# Patient Record
Sex: FEMALE | Race: WHITE | Employment: STUDENT | URBAN - NONMETROPOLITAN AREA
[De-identification: names, ages, dates, MRNs, and addresses within clinical notes are randomized per-mention and may not be internally consistent; named-entity substitution may affect disease eponyms.]

---

## 2023-09-18 ENCOUNTER — OFFICE VISIT (OUTPATIENT)
Age: 18
End: 2023-09-18

## 2023-09-18 VITALS
HEIGHT: 65 IN | RESPIRATION RATE: 16 BRPM | SYSTOLIC BLOOD PRESSURE: 120 MMHG | BODY MASS INDEX: 30.99 KG/M2 | TEMPERATURE: 98.6 F | WEIGHT: 186 LBS | HEART RATE: 53 BPM | OXYGEN SATURATION: 99 % | DIASTOLIC BLOOD PRESSURE: 68 MMHG

## 2023-09-18 DIAGNOSIS — N92.0 MENORRHAGIA WITH REGULAR CYCLE: ICD-10-CM

## 2023-09-18 LAB
HCG, URINE, POC: NEGATIVE
Lab: NORMAL
NEGATIVE QC PASS/FAIL: NORMAL
POSITIVE QC PASS/FAIL: NORMAL

## 2023-09-18 PROCEDURE — 99214 OFFICE O/P EST MOD 30 MIN: CPT | Performed by: STUDENT IN AN ORGANIZED HEALTH CARE EDUCATION/TRAINING PROGRAM

## 2023-09-18 PROCEDURE — 81025 URINE PREGNANCY TEST: CPT | Performed by: STUDENT IN AN ORGANIZED HEALTH CARE EDUCATION/TRAINING PROGRAM

## 2023-09-18 RX ORDER — NORGESTIMATE AND ETHINYL ESTRADIOL 7DAYSX3 28
1 KIT ORAL DAILY
Qty: 28 TABLET | Refills: 11 | Status: SHIPPED | OUTPATIENT
Start: 2023-09-18

## 2023-09-18 RX ORDER — FLUOXETINE HYDROCHLORIDE 40 MG/1
CAPSULE ORAL
COMMUNITY
Start: 2021-04-18

## 2023-09-18 SDOH — ECONOMIC STABILITY: HOUSING INSECURITY
IN THE LAST 12 MONTHS, WAS THERE A TIME WHEN YOU DID NOT HAVE A STEADY PLACE TO SLEEP OR SLEPT IN A SHELTER (INCLUDING NOW)?: NO

## 2023-09-18 SDOH — ECONOMIC STABILITY: TRANSPORTATION INSECURITY
IN THE PAST 12 MONTHS, HAS LACK OF TRANSPORTATION KEPT YOU FROM MEETINGS, WORK, OR FROM GETTING THINGS NEEDED FOR DAILY LIVING?: NO

## 2023-09-18 SDOH — ECONOMIC STABILITY: INCOME INSECURITY: HOW HARD IS IT FOR YOU TO PAY FOR THE VERY BASICS LIKE FOOD, HOUSING, MEDICAL CARE, AND HEATING?: NOT VERY HARD

## 2023-09-18 SDOH — ECONOMIC STABILITY: FOOD INSECURITY: WITHIN THE PAST 12 MONTHS, THE FOOD YOU BOUGHT JUST DIDN'T LAST AND YOU DIDN'T HAVE MONEY TO GET MORE.: NEVER TRUE

## 2023-09-18 SDOH — ECONOMIC STABILITY: FOOD INSECURITY: WITHIN THE PAST 12 MONTHS, YOU WORRIED THAT YOUR FOOD WOULD RUN OUT BEFORE YOU GOT MONEY TO BUY MORE.: NEVER TRUE

## 2023-09-18 ASSESSMENT — PATIENT HEALTH QUESTIONNAIRE - PHQ9
2. FEELING DOWN, DEPRESSED OR HOPELESS: 0
SUM OF ALL RESPONSES TO PHQ QUESTIONS 1-9: 0
1. LITTLE INTEREST OR PLEASURE IN DOING THINGS: 0
SUM OF ALL RESPONSES TO PHQ9 QUESTIONS 1 & 2: 0

## 2023-09-18 NOTE — PROGRESS NOTES
[address] [address]  Dept: [phone]  Loc: [phone]    Yovany Guardado is a 21 y.o. female who presents today for:  Chief Complaint   Patient presents with    Contraception     Pt wanting birth control. Never sexually active. Normal periods. Assessment/Plan:     Cindy Payan was seen today for contraception. Diagnoses and all orders for this visit:    Menorrhagia with regular cycle  -     POC Pregnancy Urine Qual  -     Norgestim-Eth Estrad Triphasic (TRI-SPRINTEC) 0.18/0.215/0.25 MG-35 MCG TABS; Take 1 tablet by mouth daily      After discussion about options, patient would like to try combo pill as above for period control. No follow-ups on file. Medications Prescribed:  Orders Placed This Encounter   Medications    Norgestim-Eth Estrad Triphasic (TRI-SPRINTEC) 0.18/0.215/0.25 MG-35 MCG TABS     Sig: Take 1 tablet by mouth daily     Dispense:  28 tablet     Refill:  11       No future appointments. HPI:     HPI  Patient states that she is interested in something for period control. Has regular periods but state can be heavy at times. She doesn't ever know when they are going to be heavy or not. 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 She also does get period cramps monthly and feels like sometimes these are severe. Not yet sexually active. Does have boyfriend currently. States mom has had a clot in the past but patient has never had a clot, doesn't think there's a clotting disorder in family. No history of migraines or migraines with Aura. Subjective:      Review of Systems   Genitourinary:  Positive for menstrual problem and vaginal bleeding. Negative for dysuria, frequency, genital sores, pelvic pain, urgency, vaginal discharge and vaginal pain.          Objective:     Vitals:    09/18/23 1457   BP: 120/68   Pulse: 53   Resp: 16   Temp:

## 2023-12-05 ENCOUNTER — OFFICE VISIT (OUTPATIENT)
Age: 18
End: 2023-12-05

## 2023-12-05 VITALS
WEIGHT: 196 LBS | HEART RATE: 53 BPM | SYSTOLIC BLOOD PRESSURE: 118 MMHG | DIASTOLIC BLOOD PRESSURE: 78 MMHG | TEMPERATURE: 98.4 F | RESPIRATION RATE: 18 BRPM | BODY MASS INDEX: 32.62 KG/M2 | OXYGEN SATURATION: 98 %

## 2023-12-05 DIAGNOSIS — M54.50 ACUTE BILATERAL LOW BACK PAIN, UNSPECIFIED WHETHER SCIATICA PRESENT: ICD-10-CM

## 2023-12-05 DIAGNOSIS — U07.1 COVID-19: Primary | ICD-10-CM

## 2023-12-05 PROBLEM — F50.20 BULIMIA NERVOSA: Status: ACTIVE | Noted: 2023-05-22

## 2023-12-05 PROBLEM — F41.9 ANXIETY DISORDER: Status: ACTIVE | Noted: 2023-05-22

## 2023-12-05 PROBLEM — G47.00 PERSISTENT INSOMNIA: Status: ACTIVE | Noted: 2023-12-05

## 2023-12-05 PROBLEM — F50.2 BULIMIA NERVOSA: Status: ACTIVE | Noted: 2023-05-22

## 2023-12-05 LAB
BILIRUBIN, POC: NEGATIVE
BLOOD URINE, POC: NEGATIVE
CLARITY, POC: CLEAR
COLOR, POC: YELLOW
GLUCOSE URINE, POC: NEGATIVE
INFLUENZA VIRUS A RNA: NEGATIVE
INFLUENZA VIRUS B RNA: NEGATIVE
KETONES, POC: NEGATIVE
LEUKOCYTE EST, POC: NEGATIVE
Lab: ABNORMAL
NITRITE, POC: NEGATIVE
PH, POC: 7.5
PROTEIN, POC: NEGATIVE
QC PASS/FAIL: ABNORMAL
S PYO AG THROAT QL: NORMAL
SARS-COV-2 RDRP RESP QL NAA+PROBE: POSITIVE
SPECIFIC GRAVITY, POC: 1
UROBILINOGEN, POC: 0.2

## 2023-12-05 PROCEDURE — 87635 SARS-COV-2 COVID-19 AMP PRB: CPT | Performed by: NURSE PRACTITIONER

## 2023-12-05 PROCEDURE — 87502 INFLUENZA DNA AMP PROBE: CPT | Performed by: NURSE PRACTITIONER

## 2023-12-05 PROCEDURE — 87880 STREP A ASSAY W/OPTIC: CPT | Performed by: NURSE PRACTITIONER

## 2023-12-05 PROCEDURE — 81003 URINALYSIS AUTO W/O SCOPE: CPT | Performed by: NURSE PRACTITIONER

## 2023-12-05 PROCEDURE — 99213 OFFICE O/P EST LOW 20 MIN: CPT | Performed by: NURSE PRACTITIONER

## 2023-12-05 RX ORDER — M-VIT,TX,IRON,MINS/CALC/FOLIC 27MG-0.4MG
1 TABLET ORAL DAILY
COMMUNITY

## 2023-12-05 RX ORDER — CLONIDINE HYDROCHLORIDE 0.2 MG/1
TABLET ORAL
COMMUNITY
Start: 2023-11-13

## 2023-12-05 ASSESSMENT — ENCOUNTER SYMPTOMS
CHEST TIGHTNESS: 0
SORE THROAT: 1
DIARRHEA: 0
RHINORRHEA: 1
COUGH: 1
WHEEZING: 0
VOMITING: 0
BACK PAIN: 1
SHORTNESS OF BREATH: 0
NAUSEA: 0

## 2023-12-05 NOTE — PROGRESS NOTES
Coughing, started as dry cough last week, now a wet cough, runny nose, muscle aches. No fever, sore throat. Lower back pain this am, now more a dull ache. Had a UTI mid November, finished antibiotic treatment.

## 2023-12-05 NOTE — PROGRESS NOTES
[address] [address]  Dept: [phone]  Loc: [phone]     Kelsey Olson is a 19 y.o. female who presents today for:  Chief Complaint   Patient presents with    Other     cold       Assessment/Plan:     1. COVID-19  -Go to the ER immediately if you experience trouble breathing, chest pain, or dizziness. Seek higher level of care if symptoms worsen. Increase intake of clear liquids. 2. Acute bilateral low back pain, unspecified whether sciatica present  -UA neg. F/u if develop worsening pain, dysuria, urgency, hematuria, fever, N/V.  - POCT Urinalysis No Micro (Auto)       Results for orders placed or performed in visit on 12/05/23   POCT rapid strep A   Result Value Ref Range    Strep A Ag None Detected None Detected   POCT COVID-19 Rapid, NAAT   Result Value Ref Range    SARS-COV-2, RdRp gene Positive (A) Negative    Lot Number [id number]     QC Pass/Fail pass    POCT Influenza A/B DNA (Alere i)   Result Value Ref Range    Influenza virus A RNA Negative     Influenza virus B RNA Negative    POCT Urinalysis No Micro (Auto)   Result Value Ref Range    Color, UA Yellow     Clarity, UA Clear     Glucose, UA POC Negative     Bilirubin, UA Negative     Ketones, UA Negative     Spec Grav, UA 1.005     Blood, UA POC Negative     pH, UA 7.5     Protein, UA POC Negative     Urobilinogen, UA 0.2     Leukocytes, UA Negative     Nitrite, UA Negative         Medications Ordered:  No orders of the defined types were placed in this encounter. No follow-ups on file. No future appointments. HPI:   Pt presents with productive cough x1 week, runny nose, body aches, sore throat x2 days. Denies fever, SOB, wheezing, chest pain, heart palpitations, and dizziness. Also reports low back pain along with body aches, had recent UTI and was treated.       Subjective:      Review of Systems   Constitutional:  Negative

## 2024-02-12 ENCOUNTER — OFFICE VISIT (OUTPATIENT)
Age: 19
End: 2024-02-12

## 2024-02-12 VITALS
RESPIRATION RATE: 16 BRPM | OXYGEN SATURATION: 98 % | HEART RATE: 62 BPM | HEIGHT: 65 IN | WEIGHT: 204 LBS | TEMPERATURE: 98.6 F | BODY MASS INDEX: 33.99 KG/M2 | SYSTOLIC BLOOD PRESSURE: 118 MMHG | DIASTOLIC BLOOD PRESSURE: 78 MMHG

## 2024-02-12 DIAGNOSIS — N39.0 ACUTE URINARY TRACT INFECTION: Primary | ICD-10-CM

## 2024-02-12 LAB
BILIRUBIN, POC: NEGATIVE
BLOOD URINE, POC: NEGATIVE
CLARITY, POC: NORMAL
COLOR, POC: YELLOW
GLUCOSE URINE, POC: NEGATIVE
KETONES, POC: NEGATIVE
LEUKOCYTE EST, POC: NORMAL
NITRITE, POC: NEGATIVE
PH, POC: 7
PROTEIN, POC: NORMAL
SPECIFIC GRAVITY, POC: 1.02
UROBILINOGEN, POC: 0.2

## 2024-02-12 PROCEDURE — 99213 OFFICE O/P EST LOW 20 MIN: CPT | Performed by: NURSE PRACTITIONER

## 2024-02-12 PROCEDURE — 81003 URINALYSIS AUTO W/O SCOPE: CPT | Performed by: NURSE PRACTITIONER

## 2024-02-12 RX ORDER — NITROFURANTOIN 25; 75 MG/1; MG/1
100 CAPSULE ORAL 2 TIMES DAILY
Qty: 10 CAPSULE | Refills: 0 | Status: SHIPPED | OUTPATIENT
Start: 2024-02-12 | End: 2024-02-17

## 2024-02-12 NOTE — PROGRESS NOTES
[facility]  [facility]  [address] [facility] [address]  Dept: [phone]  Loc: [phone]     Marvin St is a 18 y.o. female who presents today for:  Chief Complaint   Patient presents with    Urinary Tract Infection     Painful when urinating, nausea, denies discharge, or odor. Symptoms started yesterday        Assessment/Plan:     1. Acute urinary tract infection  -Follow up if there's no improvement in symptoms in 48-72 hours.  Seek higher level of care if develop fever, back pain, vomiting, severe chills, or blood in your urine.  Increase intake of clear liquids.   -discussed that if pt has another UTI in the next few months will refer to urology       Results for orders placed or performed in visit on 02/12/24   POCT Urinalysis No Micro (Auto)   Result Value Ref Range    Color, UA YELLOW     Clarity, UA SLIGHLTY CLOUDY     Glucose, UA POC NEGATIVE     Bilirubin, UA NEGATIVE     Ketones, UA NEGATIVE     Spec Grav, UA 1.020     Blood, UA POC NEGATIVE     pH, UA 7.0     Protein, UA POC NEG     Urobilinogen, UA 0.2     Leukocytes, UA SMALL     Nitrite, UA NEGATIVE         Medications Ordered:  Orders Placed This Encounter   Medications    nitrofurantoin, macrocrystal-monohydrate, (MACROBID) 100 MG capsule     Sig: Take 1 capsule by mouth 2 times daily for 5 days     Dispense:  10 capsule     Refill:  0       No follow-ups on file.    No future appointments.    HPI:   Pt presents with dysuria, nausea, and urinary frequency x1 day.  Denies abnl vaginal discharge.  Denies vomiting, back pain, abd pain, fever, and hematuria.  Denies chance of pregnancy and STI, declines testing.  Had UTI 3 months ago and took Macrobid for sxs which helped.  Has not had UTI or ATBs since.  Had no previous UTIs prior to the one 3 months ago.     Urinary Tract Infection  This is a new problem. The current episode started yesterday. The problem has been

## 2024-02-12 NOTE — PATIENT INSTRUCTIONS
Follow up if there's no improvement in symptoms in 48-72 hours.  Seek higher level of care if develop fever, back pain, vomiting, severe chills, or blood in your urine.  Increase intake of clear liquids.

## 2024-09-09 ENCOUNTER — OFFICE VISIT (OUTPATIENT)
Age: 19
End: 2024-09-09
Payer: COMMERCIAL

## 2024-09-09 DIAGNOSIS — F41.1 GENERALIZED ANXIETY DISORDER: Primary | ICD-10-CM

## 2024-09-09 PROCEDURE — 90834 PSYTX W PT 45 MINUTES: CPT | Performed by: SOCIAL WORKER

## 2025-02-03 ENCOUNTER — OFFICE VISIT (OUTPATIENT)
Age: 20
End: 2025-02-03

## 2025-02-03 VITALS
HEART RATE: 80 BPM | SYSTOLIC BLOOD PRESSURE: 122 MMHG | WEIGHT: 185 LBS | OXYGEN SATURATION: 96 % | BODY MASS INDEX: 30.79 KG/M2 | RESPIRATION RATE: 18 BRPM | TEMPERATURE: 101.7 F | DIASTOLIC BLOOD PRESSURE: 78 MMHG

## 2025-02-03 DIAGNOSIS — J10.1 INFLUENZA A: Primary | ICD-10-CM

## 2025-02-03 LAB
INFLUENZA VIRUS A RNA: POSITIVE
INFLUENZA VIRUS B RNA: ABNORMAL
STREPTOCOCCUS A RNA: NEGATIVE

## 2025-02-03 PROCEDURE — 87502 INFLUENZA DNA AMP PROBE: CPT | Performed by: NURSE PRACTITIONER

## 2025-02-03 PROCEDURE — 87651 STREP A DNA AMP PROBE: CPT | Performed by: NURSE PRACTITIONER

## 2025-02-03 PROCEDURE — 99213 OFFICE O/P EST LOW 20 MIN: CPT | Performed by: NURSE PRACTITIONER

## 2025-02-03 RX ORDER — KETOCONAZOLE 20 MG/G
CREAM TOPICAL
COMMUNITY
Start: 2025-01-13

## 2025-02-03 RX ORDER — NYSTATIN TOPICAL POWDER 100000 U/G
POWDER TOPICAL
COMMUNITY
Start: 2025-01-13

## 2025-02-03 RX ORDER — ALBUTEROL SULFATE 90 UG/1
2 INHALANT RESPIRATORY (INHALATION)
COMMUNITY
Start: 2024-02-25 | End: 2025-02-03 | Stop reason: CLARIF

## 2025-02-03 RX ORDER — OSELTAMIVIR PHOSPHATE 75 MG/1
75 CAPSULE ORAL EVERY 12 HOURS
Qty: 10 CAPSULE | Refills: 0 | Status: SHIPPED | OUTPATIENT
Start: 2025-02-03 | End: 2025-02-08

## 2025-02-03 SDOH — ECONOMIC STABILITY: FOOD INSECURITY: WITHIN THE PAST 12 MONTHS, THE FOOD YOU BOUGHT JUST DIDN'T LAST AND YOU DIDN'T HAVE MONEY TO GET MORE.: NEVER TRUE

## 2025-02-03 SDOH — ECONOMIC STABILITY: FOOD INSECURITY: WITHIN THE PAST 12 MONTHS, YOU WORRIED THAT YOUR FOOD WOULD RUN OUT BEFORE YOU GOT MONEY TO BUY MORE.: NEVER TRUE

## 2025-02-03 ASSESSMENT — PATIENT HEALTH QUESTIONNAIRE - PHQ9
SUM OF ALL RESPONSES TO PHQ9 QUESTIONS 1 & 2: 0
SUM OF ALL RESPONSES TO PHQ QUESTIONS 1-9: 0
SUM OF ALL RESPONSES TO PHQ QUESTIONS 1-9: 0
1. LITTLE INTEREST OR PLEASURE IN DOING THINGS: NOT AT ALL
2. FEELING DOWN, DEPRESSED OR HOPELESS: NOT AT ALL
SUM OF ALL RESPONSES TO PHQ QUESTIONS 1-9: 0
SUM OF ALL RESPONSES TO PHQ QUESTIONS 1-9: 0

## 2025-02-03 ASSESSMENT — ENCOUNTER SYMPTOMS
COUGH: 1
SORE THROAT: 1
CHEST TIGHTNESS: 0
NAUSEA: 1
DIARRHEA: 0
ABDOMINAL PAIN: 0
WHEEZING: 0
SHORTNESS OF BREATH: 0
VOMITING: 1

## 2025-02-03 NOTE — PATIENT INSTRUCTIONS
Follow up if no improvement in 48-72 hours.  Go to ED if develop chest pain, dizziness, trouble breathing, neck stiffness, or temp >103F

## 2025-02-03 NOTE — PROGRESS NOTES
Systems   Constitutional:  Positive for chills and fever.   HENT:  Positive for sore throat. Negative for congestion.    Respiratory:  Positive for cough. Negative for chest tightness, shortness of breath and wheezing.    Cardiovascular:  Negative for chest pain and palpitations.   Gastrointestinal:  Positive for nausea and vomiting. Negative for abdominal pain and diarrhea.   Musculoskeletal:  Positive for myalgias.   Neurological:  Positive for dizziness. Negative for light-headedness and headaches.         Objective:     Vitals:    02/03/25 1250   BP: 122/78   Site: Right Upper Arm   Position: Sitting   Cuff Size: Medium Adult   Pulse: 80   Resp: 18   Temp: (!) 101.7 °F (38.7 °C)   TempSrc: Tympanic   SpO2: 96%   Weight: 83.9 kg (185 lb)       Body mass index is 30.79 kg/m².    Wt Readings from Last 3 Encounters:   02/03/25 83.9 kg (185 lb)   02/12/24 92.5 kg (204 lb)   12/05/23 88.9 kg (196 lb)     BP Readings from Last 3 Encounters:   02/03/25 122/78   02/12/24 118/78   12/05/23 118/78       Physical Exam  Vitals and nursing note reviewed.   Constitutional:       Appearance: She is ill-appearing. She is not toxic-appearing.   HENT:      Head: Normocephalic.      Right Ear: Hearing, tympanic membrane, ear canal and external ear normal.      Left Ear: Hearing, tympanic membrane, ear canal and external ear normal.      Nose: Nose normal.      Right Sinus: No maxillary sinus tenderness or frontal sinus tenderness.      Left Sinus: No maxillary sinus tenderness or frontal sinus tenderness.      Mouth/Throat:      Lips: Pink.      Mouth: Mucous membranes are moist.      Pharynx: Uvula midline. Posterior oropharyngeal erythema present.      Tonsils: No tonsillar exudate. 0 on the right. 0 on the left.   Neck:      Meningeal: Brudzinski's sign and Kernig's sign absent.   Cardiovascular:      Rate and Rhythm: Normal rate and regular rhythm.      Pulses: Normal pulses.      Heart sounds: Normal heart sounds.   Pulmonary:

## 2025-02-19 ENCOUNTER — OFFICE VISIT (OUTPATIENT)
Age: 20
End: 2025-02-19
Payer: COMMERCIAL

## 2025-02-19 DIAGNOSIS — F41.1 GENERALIZED ANXIETY DISORDER: Primary | ICD-10-CM

## 2025-02-19 PROCEDURE — 90834 PSYTX W PT 45 MINUTES: CPT | Performed by: SOCIAL WORKER

## 2025-02-19 NOTE — PROGRESS NOTES
[facility]    Marvin St (:  2005) is a 19 y.o. female here for ongoing treatment of the following chief complaint(s):  Chief Complaint   Patient presents with    Anxiety        Subjective   Marvin presents for session for first time this semester. She states that she has been really struggling over the past few weeks emotionally. She states that thoughts of self harm have returned again. She states that she has not acted on any thoughts and will continue to refute her thoughts. Provided her with support and empathy on the matter. She states that she has been working to keep herself from acting on this and distracting herself. Explored further with her. She states that she is also in a new relationship that is long distance and this is adding to her stress as well. Discussed with her continued ways to manage her emotions and feelings of depression and anxiety. She states stress from school and tennis is impacting her as well. She reports wanting to continue regular follow ups. Will meet with this worker again in 2 weeks.          Objective   Pt was engaged and shared openly throughout session. Pt was able to generate topics for discussion. Pt was alert and oriented and casually dressed. Affect appeared full and matched the content of the session. Pt mood was euthymic. Pt is making progress towards goals that have been established by self and worker in therapy.        Assessment & Plan     ICD-10-CM    1. Generalized anxiety disorder  F41.1          Therapist provided support, empathy, and validation to Pt throughout session. Therapist also utilized interpersonal therapy skills to assist pt throughout session. Also utilized CBT methods to reframe anxious and negative thoughts. Pt was open and accepting to these strategies and will continue to return to follow up to discuss and manage further her sx of anxiety. Pt is making progress towards their established therapy

## 2025-03-07 ENCOUNTER — OFFICE VISIT (OUTPATIENT)
Age: 20
End: 2025-03-07
Payer: COMMERCIAL

## 2025-03-07 DIAGNOSIS — F41.1 GENERALIZED ANXIETY DISORDER: Primary | ICD-10-CM

## 2025-03-07 PROCEDURE — 90837 PSYTX W PT 60 MINUTES: CPT | Performed by: SOCIAL WORKER

## 2025-03-07 NOTE — PROGRESS NOTES
Worker provided therapy to pt for 60 minutes today.   Return in about 3 weeks (around 3/28/2025).     An electronic signature was used to authenticate this note.    OZIEL Xie 3/7/2025 9:59 AM

## 2025-03-19 ENCOUNTER — OFFICE VISIT (OUTPATIENT)
Age: 20
End: 2025-03-19
Payer: COMMERCIAL

## 2025-03-19 DIAGNOSIS — F41.1 GENERALIZED ANXIETY DISORDER: Primary | ICD-10-CM

## 2025-03-19 PROCEDURE — 90834 PSYTX W PT 45 MINUTES: CPT | Performed by: SOCIAL WORKER

## 2025-03-19 NOTE — PROGRESS NOTES
[facility]    Marvin St (:  2005) is a 19 y.o. female here for ongoing treatment of the following chief complaint(s):  Chief Complaint   Patient presents with    Anxiety        Subjective   Vin presents having requested this session to meet with worker. She states that her and her BF have broken up and she is really struggling with this as well as a lot of her past trauma resurfacing at the same time. She states that she has been struggling with suicidal ideation the last couple days. Explored further with her. She states that she has had increased frequency of thoughts. She states at this time she has no immediate plan to end her life and wants the thoughts to go away. Spent a significant amount of time in session today discussing with her safety planning to ensure her safety. She states that she wants to go home for a few days to be around her parents and have their support. Discussed this idea and then safety planned should she have to stay on campus. Discussed specific tasks of placing her razors and medications in her car to keep them out of her room if thoughts should return while she is in her room. At this time, she is of no immediate danger to herself and is not in need of hospitalization for SI. Discussed with her regarding warning signs of thoughts and bx worsening. Discussed steps to take if her thoughts do worsen. She reports that she will email this worker and inform of her plan to either go home or stay on campus. Has a follow up the beginning of next week scheduled. Will reach out if she needs services sooner.          Objective   Pt was engaged and shared openly throughout session. Pt was able to generate topics for discussion. Pt was alert and oriented and casually dressed. Affect appeared full and matched the content of the session. Pt mood was euthymic. Pt is making progress towards goals that have been established by self and worker in therapy.

## 2025-03-25 ENCOUNTER — OFFICE VISIT (OUTPATIENT)
Age: 20
End: 2025-03-25
Payer: COMMERCIAL

## 2025-03-25 DIAGNOSIS — F41.1 GENERALIZED ANXIETY DISORDER: Primary | ICD-10-CM

## 2025-03-25 PROCEDURE — 90834 PSYTX W PT 45 MINUTES: CPT | Performed by: SOCIAL WORKER

## 2025-03-25 NOTE — PROGRESS NOTES
[facility]    Marvin St (:  2005) is a 19 y.o. female here for ongoing treatment of the following chief complaint(s):  Chief Complaint   Patient presents with    Anxiety        Subjective   Vin presents and states that she is doing much better today. She states that she went home last week and this was helpful. Spent time with family and giving herself space to process her breakup and feelings. She states that SI is significantly decreased and she still has not self harmed. Navigated with her. Spent time reviewing the safety plan she made with her parents. Supported this. She has it on her phone with her at all times. She states that this continues to be helpful. Spent time today discussing her breakup with her BF and moving forward from this. Validated her feelings and emotions. She was engaged and shared her thoughts openly throughout. Challenged negative thoughts as needed. Follow up with her in 1 week per her request. Will continue weekly follow ups for the time being as things continue to be higher stress for her.        Objective   Pt was engaged and shared openly throughout session. Pt was able to generate topics for discussion. Pt was alert and oriented and casually dressed. Affect appeared full and matched the content of the session. Pt mood was euthymic. Pt is making progress towards goals that have been established by self and worker in therapy.        Assessment & Plan     ICD-10-CM    1. Generalized anxiety disorder  F41.1          Therapist provided support, empathy, and validation to Pt throughout session. Therapist also utilized interpersonal therapy skills to assist pt throughout session. Also utilized CBT methods to reframe anxious and negative thoughts. Pt was open and accepting to these strategies and will continue to return to follow up to discuss and manage further her sx of anxiety. Pt is making progress towards their established therapy goals.

## 2025-04-02 ENCOUNTER — OFFICE VISIT (OUTPATIENT)
Age: 20
End: 2025-04-02
Payer: COMMERCIAL

## 2025-04-02 DIAGNOSIS — F41.1 GENERALIZED ANXIETY DISORDER: Primary | ICD-10-CM

## 2025-04-02 DIAGNOSIS — F33.2 SEVERE EPISODE OF RECURRENT MAJOR DEPRESSIVE DISORDER, WITHOUT PSYCHOTIC FEATURES (HCC): ICD-10-CM

## 2025-04-02 PROCEDURE — 90834 PSYTX W PT 45 MINUTES: CPT | Performed by: SOCIAL WORKER

## 2025-04-02 NOTE — PROGRESS NOTES
[facility]    Marvin St (:  2005) is a 19 y.o. female here for ongoing treatment of the following chief complaint(s):  Chief Complaint: Depression and anxiety       Subjective   Vin presents and states that she continues to struggle with anxiety and depression. Today, conversation focused on depression as it pertains to her life as well as with eating concerns and body image. Navigated and explored with her. Spent a great deal of session navigating and challenging her negative thoughts. She states that she has been working to overcome her body image concerns but they persist. Pointed out her negative thoughts as she stated them in session and highlighted with her the pain they are causing her. She agreed. Discussed change of thought patterns and encouraged her to actively challenge these thoughts when they arise rather than placing value in them. Follow up in 2 weeks to discuss further.        Objective   Pt was engaged and shared openly throughout session. Pt was able to generate topics for discussion. Pt was alert and oriented and casually dressed. Affect appeared full and matched the content of the session. Pt mood was euthymic. Pt is making progress towards goals that have been established by self and worker in therapy.        Assessment & Plan     ICD-10-CM    1. Generalized anxiety disorder  F41.1       2. Severe episode of recurrent major depressive disorder, without psychotic features (HCC)  F33.2       Dx of depression added today due to ongoing nature of sx relating to depression. Low mood, motivation, recurrent SI, amongst others. Has persisted for well over 2 weeks.      Therapist provided support, empathy, and validation to Pt throughout session. Therapist also utilized interpersonal therapy skills to assist pt throughout session. Also utilized CBT methods to reframe anxious and negative thoughts. Pt was open and accepting to these strategies and will

## 2025-04-09 ENCOUNTER — OFFICE VISIT (OUTPATIENT)
Age: 20
End: 2025-04-09
Payer: COMMERCIAL

## 2025-04-09 DIAGNOSIS — F33.2 SEVERE EPISODE OF RECURRENT MAJOR DEPRESSIVE DISORDER, WITHOUT PSYCHOTIC FEATURES (HCC): Primary | ICD-10-CM

## 2025-04-09 DIAGNOSIS — F41.1 GENERALIZED ANXIETY DISORDER: ICD-10-CM

## 2025-04-09 PROCEDURE — 90834 PSYTX W PT 45 MINUTES: CPT | Performed by: SOCIAL WORKER

## 2025-04-09 NOTE — PROGRESS NOTES
[facility]    Marvin St (:  2005) is a 19 y.o. female here for ongoing treatment of the following chief complaint(s):  Chief Complaint   Patient presents with    Anxiety    Depression        Subjective   Vin presents and states that she is struggling a bit today with a few things. She states that today is her exs bday as well as the day that they first started talking. She states that she has been navigating through this and the emotions that she is feeling. Provided her support and discussed with her regarding how she has been processing. Navigating well through self blame and is moving forward from these negative thoughts. States that self blame is here much less often. Discussed with her and processed how she has been navigating through body image concerns as well. Still having some struggles with this. Working to continue challenging her negative thoughts. She states she will continue to work to do this and report back to this worker in 2 weeks.          Objective   Pt was engaged and shared openly throughout session. Pt was able to generate topics for discussion. Pt was alert and oriented and casually dressed. Affect appeared full and matched the content of the session. Pt mood was euthymic. Pt is making progress towards goals that have been established by self and worker in therapy.        Assessment & Plan     ICD-10-CM    1. Severe episode of recurrent major depressive disorder, without psychotic features (HCC)  F33.2       2. Generalized anxiety disorder  F41.1          Therapist provided support, empathy, and validation to Pt throughout session. Therapist also utilized interpersonal therapy skills to assist pt throughout session. Also utilized CBT methods to reframe anxious and negative thoughts. Pt was open and accepting to these strategies and will continue to return to follow up to discuss and manage further her sx of anxiety and depression. Pt is making

## 2025-04-23 ENCOUNTER — OFFICE VISIT (OUTPATIENT)
Age: 20
End: 2025-04-23
Payer: COMMERCIAL

## 2025-04-23 DIAGNOSIS — F41.1 GENERALIZED ANXIETY DISORDER: Primary | ICD-10-CM

## 2025-04-23 PROCEDURE — 90834 PSYTX W PT 45 MINUTES: CPT | Performed by: SOCIAL WORKER

## 2025-04-23 NOTE — PROGRESS NOTES
[facility]    Marvin St (:  2005) is a 19 y.o. female here for ongoing treatment of the following chief complaint(s):  Chief Complaint   Patient presents with    Anxiety        Subjective    Vin presents and states that she has been feeling very stressed with school. She reports that she has been battling through ongoing stress with peer relationships. Discussed this in depth. Discussed relation back to internal concerns of self worth. Continued to provide her empathy and challenge her negative thought process. She states she is working to halt these concerns. From here, discussed some time management concerns with school as the semester is coming to an end. Will work to implement these over the next few weeks and will follow up at the end of next week.          Objective   Pt was engaged and shared openly throughout session. Pt was able to generate topics for discussion. Pt was alert and oriented and casually dressed. Affect appeared full and matched the content of the session. Pt mood was euthymic. Pt is making progress towards goals that have been established by self and worker in therapy.        Assessment & Plan     ICD-10-CM    1. Generalized anxiety disorder  F41.1          Therapist provided support, empathy, and validation to Pt throughout session. Therapist also utilized interpersonal therapy skills to assist pt throughout session. Also utilized CBT methods to reframe anxious and negative thoughts. Pt was open and accepting to these strategies and will continue to return to follow up to discuss and manage further her sx of anxiety. Pt is making progress towards their established therapy goals.     Worker provided therapy to pt for 45 minutes today.   Return in about 1 week (around 2025).     An electronic signature was used to authenticate this note.    OZIEL Xie 2025 11:08 AM

## 2025-05-02 ENCOUNTER — OFFICE VISIT (OUTPATIENT)
Age: 20
End: 2025-05-02
Payer: COMMERCIAL

## 2025-05-02 DIAGNOSIS — F41.1 GENERALIZED ANXIETY DISORDER: Primary | ICD-10-CM

## 2025-05-02 DIAGNOSIS — F33.2 SEVERE EPISODE OF RECURRENT MAJOR DEPRESSIVE DISORDER, WITHOUT PSYCHOTIC FEATURES (HCC): ICD-10-CM

## 2025-05-02 PROCEDURE — 90834 PSYTX W PT 45 MINUTES: CPT | Performed by: SOCIAL WORKER

## 2025-05-02 NOTE — PROGRESS NOTES
[facility]    Marvin St (:  2005) is a 19 y.o. female here for ongoing treatment of the following chief complaint(s):  Chief Complaint   Patient presents with    Anxiety    Depression        Subjective   Vin presents and states that she has been struggling with tennis the past week. 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 Discussed with her and processed. Explored the ways in which this has been impacting her negative thoughts. She states that she has been having a lot of trouble with her angst and over thinking. Navigated with her. Will plan to follow up in 1 week after the [employer] finals this weekend. She reports that she is considering going home next weekend. Discussed the next 2 weeks and finishing school. She reports that she is also feeling very home sick.          Objective   Pt was engaged and shared openly throughout session. Pt was able to generate topics for discussion. Pt was alert and oriented and casually dressed. Affect appeared full and matched the content of the session. Pt mood was euthymic. Pt is making progress towards goals that have been established by self and worker in therapy.        Assessment & Plan     ICD-10-CM    1. Generalized anxiety disorder  F41.1       2. Severe episode of recurrent major depressive disorder, without psychotic features (HCC)  F33.2          Therapist provided support, empathy, and validation to Pt throughout session. Therapist also utilized interpersonal therapy skills to assist pt throughout session. Also utilized CBT methods to reframe anxious and negative thoughts. Pt was open and accepting to these strategies and will continue to return to follow up to discuss and manage further her sx of anxiety. Pt is making progress towards their established therapy goals.     Worker provided therapy to pt for 45 minutes today.   Return in about 1 week (around 2025).

## 2025-05-07 ENCOUNTER — OFFICE VISIT (OUTPATIENT)
Age: 20
End: 2025-05-07
Payer: COMMERCIAL

## 2025-05-07 DIAGNOSIS — F41.1 GENERALIZED ANXIETY DISORDER: Primary | ICD-10-CM

## 2025-05-07 DIAGNOSIS — F33.2 SEVERE EPISODE OF RECURRENT MAJOR DEPRESSIVE DISORDER, WITHOUT PSYCHOTIC FEATURES (HCC): ICD-10-CM

## 2025-05-07 PROCEDURE — 90834 PSYTX W PT 45 MINUTES: CPT | Performed by: SOCIAL WORKER
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
## 2025-05-13 ENCOUNTER — OFFICE VISIT (OUTPATIENT)
Age: 20
End: 2025-05-13
Payer: COMMERCIAL

## 2025-05-13 DIAGNOSIS — F41.1 GENERALIZED ANXIETY DISORDER: Primary | ICD-10-CM

## 2025-05-13 DIAGNOSIS — F33.2 SEVERE EPISODE OF RECURRENT MAJOR DEPRESSIVE DISORDER, WITHOUT PSYCHOTIC FEATURES (HCC): ICD-10-CM

## 2025-05-13 PROCEDURE — 90834 PSYTX W PT 45 MINUTES: CPT | Performed by: SOCIAL WORKER

## 2025-05-13 NOTE — PROGRESS NOTES
[facility]    Marvin St (:  2005) is a 19 y.o. female here for ongoing treatment of the following chief complaint(s):  Chief Complaint   Patient presents with    Anxiety    Depression        Subjective   Vin presents and states that she is doing okay. She reports that tennis is now over and she is fx well with this. She reports now that she is in full focus for her last final today. She is hopeful that she will do well and is excited to leave and go home for the summer at the end of today. She states that she has plans to talk with a therapist back home over the summer and then wants to return to this worker for services in the . Spent time today discussing body image concerns and navigating challenges with this. She reports that she has been feeling more pressure with these thoughts as summer nears. She states that this is when these thoughts tend to be at their heaviest. Discussed CBT methods to reframing these thoughts. She reports that she will work to do this over the summer and reach out should she need anything.        Objective   Pt was engaged and shared openly throughout session. Pt was able to generate topics for discussion. Pt was alert and oriented and casually dressed. Affect appeared full and matched the content of the session. Pt mood was euthymic. Pt is making progress towards goals that have been established by self and worker in therapy.        Assessment & Plan     ICD-10-CM    1. Generalized anxiety disorder  F41.1       2. Severe episode of recurrent major depressive disorder, without psychotic features (HCC)  F33.2          Therapist provided support, empathy, and validation to Pt throughout session. Therapist also utilized interpersonal therapy skills to assist pt throughout session. Also utilized CBT methods to reframe anxious and negative thoughts. Pt was open and accepting to these strategies and will continue to return to

## 2025-08-27 ENCOUNTER — OFFICE VISIT (OUTPATIENT)
Age: 20
End: 2025-08-27
Payer: COMMERCIAL

## 2025-08-27 DIAGNOSIS — F33.2 SEVERE EPISODE OF RECURRENT MAJOR DEPRESSIVE DISORDER, WITHOUT PSYCHOTIC FEATURES (HCC): ICD-10-CM

## 2025-08-27 DIAGNOSIS — F41.1 GENERALIZED ANXIETY DISORDER: Primary | ICD-10-CM

## 2025-08-27 PROCEDURE — 90834 PSYTX W PT 45 MINUTES: CPT | Performed by: SOCIAL WORKER

## 2025-09-03 ENCOUNTER — OFFICE VISIT (OUTPATIENT)
Age: 20
End: 2025-09-03
Payer: COMMERCIAL

## 2025-09-03 DIAGNOSIS — F41.1 GENERALIZED ANXIETY DISORDER: Primary | ICD-10-CM

## 2025-09-03 DIAGNOSIS — F33.2 SEVERE EPISODE OF RECURRENT MAJOR DEPRESSIVE DISORDER, WITHOUT PSYCHOTIC FEATURES (HCC): ICD-10-CM

## 2025-09-03 PROCEDURE — 90834 PSYTX W PT 45 MINUTES: CPT | Performed by: SOCIAL WORKER
